# Patient Record
Sex: MALE | Race: WHITE | Employment: OTHER | URBAN - METROPOLITAN AREA
[De-identification: names, ages, dates, MRNs, and addresses within clinical notes are randomized per-mention and may not be internally consistent; named-entity substitution may affect disease eponyms.]

---

## 2017-01-03 ENCOUNTER — TELEPHONE (OUTPATIENT)
Dept: PALLIATIVE CARE | Facility: HOSPITAL | Age: 67
End: 2017-01-03

## 2017-01-03 ENCOUNTER — TELEPHONE (OUTPATIENT)
Dept: HEMATOLOGY/ONCOLOGY | Facility: HOSPITAL | Age: 67
End: 2017-01-03

## 2017-01-03 NOTE — TELEPHONE ENCOUNTER
Patient's brother Zelda Hunter calling. Zelda Olmedojose luis tells me that Bay Hoang is \"not doing very well at all. \" While his constipation has improved, Bay Hoang is not eating and has lost strength.  He is requiring more assistance from his brother, such as needing assistance wit

## 2017-01-03 NOTE — TELEPHONE ENCOUNTER
Spoke with Janay Soriano, he said his brother is getting weaker, I offered an appt with Dr Latasha Snider for tomorrow at [phone], Janay Soriano and pt agreeable. Luther Blow know that Dr Latasha Snider ordered a CBC and CMP, reviewed procedure for outpatient lab.   Janay Soriano verbalized understandi

## 2017-01-04 ENCOUNTER — APPOINTMENT (OUTPATIENT)
Dept: ULTRASOUND IMAGING | Facility: HOSPITAL | Age: 67
DRG: 436 | End: 2017-01-04
Attending: FAMILY MEDICINE
Payer: COMMERCIAL

## 2017-01-04 ENCOUNTER — HOSPITAL ENCOUNTER (INPATIENT)
Facility: HOSPITAL | Age: 67
LOS: 5 days | Discharge: HOSPICE | DRG: 436 | End: 2017-01-09
Attending: FAMILY MEDICINE | Admitting: FAMILY MEDICINE
Payer: COMMERCIAL

## 2017-01-04 ENCOUNTER — APPOINTMENT (OUTPATIENT)
Dept: CT IMAGING | Facility: HOSPITAL | Age: 67
DRG: 436 | End: 2017-01-04
Attending: INTERNAL MEDICINE
Payer: COMMERCIAL

## 2017-01-04 ENCOUNTER — OFFICE VISIT (OUTPATIENT)
Dept: HEMATOLOGY/ONCOLOGY | Facility: HOSPITAL | Age: 67
End: 2017-01-04
Attending: INTERNAL MEDICINE
Payer: COMMERCIAL

## 2017-01-04 ENCOUNTER — LAB ENCOUNTER (OUTPATIENT)
Dept: LAB | Facility: HOSPITAL | Age: 67
End: 2017-01-04
Attending: INTERNAL MEDICINE
Payer: COMMERCIAL

## 2017-01-04 VITALS
DIASTOLIC BLOOD PRESSURE: 73 MMHG | BODY MASS INDEX: 26 KG/M2 | SYSTOLIC BLOOD PRESSURE: 103 MMHG | TEMPERATURE: 97 F | HEART RATE: 137 BPM | WEIGHT: 192 LBS | RESPIRATION RATE: 22 BRPM

## 2017-01-04 DIAGNOSIS — R63.0 ANOREXIA: Primary | ICD-10-CM

## 2017-01-04 DIAGNOSIS — C34.90 LUNG CANCER (HCC): ICD-10-CM

## 2017-01-04 DIAGNOSIS — C34.92 METASTATIC LUNG CANCER (METASTASIS FROM LUNG TO OTHER SITE), LEFT (HCC): Primary | ICD-10-CM

## 2017-01-04 LAB
ALBUMIN SERPL BCP-MCNC: 2.4 G/DL (ref 3.5–4.8)
ALBUMIN/GLOB SERPL: 0.4 {RATIO} (ref 1–2)
ALP SERPL-CCNC: 407 U/L (ref 32–100)
ALT SERPL-CCNC: 50 U/L (ref 17–63)
ANION GAP SERPL CALC-SCNC: 15 MMOL/L (ref 0–18)
AST SERPL-CCNC: 211 U/L (ref 15–41)
BASOPHILS # BLD: 0 K/UL (ref 0–0.2)
BASOPHILS NFR BLD: 0 %
BILIRUB SERPL-MCNC: 7.8 MG/DL (ref 0.3–1.2)
BUN SERPL-MCNC: 7 MG/DL (ref 8–20)
BUN/CREAT SERPL: 9.7 (ref 10–20)
CALCIUM SERPL-MCNC: 10.3 MG/DL (ref 8.5–10.5)
CHLORIDE SERPL-SCNC: 92 MMOL/L (ref 95–110)
CO2 SERPL-SCNC: 23 MMOL/L (ref 22–32)
CREAT SERPL-MCNC: 0.72 MG/DL (ref 0.5–1.5)
EOSINOPHIL # BLD: 0.1 K/UL (ref 0–0.7)
EOSINOPHIL NFR BLD: 1 %
ERYTHROCYTE [DISTWIDTH] IN BLOOD BY AUTOMATED COUNT: 20.4 % (ref 11–15)
GLOBULIN PLAS-MCNC: 6.7 G/DL (ref 2.5–3.7)
GLUCOSE SERPL-MCNC: 119 MG/DL (ref 70–99)
HCT VFR BLD AUTO: 40.2 % (ref 41–52)
HGB BLD-MCNC: 13.7 G/DL (ref 13.5–17.5)
LYMPHOCYTES # BLD: 1.1 K/UL (ref 1–4)
LYMPHOCYTES NFR BLD: 10 %
MCH RBC QN AUTO: 36.9 PG (ref 27–32)
MCHC RBC AUTO-ENTMCNC: 34.1 G/DL (ref 32–37)
MCV RBC AUTO: 108.2 FL (ref 80–100)
MONOCYTES # BLD: 0.8 K/UL (ref 0–1)
MONOCYTES NFR BLD: 7 %
NEUTROPHILS # BLD AUTO: 8.9 K/UL (ref 1.8–7.7)
NEUTROPHILS NFR BLD: 70 %
NEUTS BAND NFR BLD: 12 %
OSMOLALITY UR CALC.SUM OF ELEC: 269 MOSM/KG (ref 275–295)
PLATELET # BLD AUTO: 34 K/UL (ref 140–400)
PMV BLD AUTO: 10.5 FL (ref 7.4–10.3)
POTASSIUM SERPL-SCNC: 4.6 MMOL/L (ref 3.3–5.1)
PROT SERPL-MCNC: 9.1 G/DL (ref 5.9–8.4)
RBC # BLD AUTO: 3.72 M/UL (ref 4.5–5.9)
SODIUM SERPL-SCNC: 130 MMOL/L (ref 136–144)
WBC # BLD AUTO: 10.8 K/UL (ref 4–11)

## 2017-01-04 PROCEDURE — 36415 COLL VENOUS BLD VENIPUNCTURE: CPT

## 2017-01-04 PROCEDURE — 80053 COMPREHEN METABOLIC PANEL: CPT

## 2017-01-04 PROCEDURE — 85025 COMPLETE CBC W/AUTO DIFF WBC: CPT

## 2017-01-04 PROCEDURE — 74177 CT ABD & PELVIS W/CONTRAST: CPT

## 2017-01-04 PROCEDURE — 76705 ECHO EXAM OF ABDOMEN: CPT

## 2017-01-04 PROCEDURE — 99223 1ST HOSP IP/OBS HIGH 75: CPT | Performed by: INTERNAL MEDICINE

## 2017-01-04 PROCEDURE — 71260 CT THORAX DX C+: CPT

## 2017-01-04 RX ORDER — SENNA AND DOCUSATE SODIUM 50; 8.6 MG/1; MG/1
1 TABLET, FILM COATED ORAL 2 TIMES DAILY
Status: DISCONTINUED | OUTPATIENT
Start: 2017-01-04 | End: 2017-01-09

## 2017-01-04 RX ORDER — HYDROCODONE BITARTRATE AND ACETAMINOPHEN 10; 325 MG/1; MG/1
1 TABLET ORAL EVERY 4 HOURS PRN
Status: DISCONTINUED | OUTPATIENT
Start: 2017-01-04 | End: 2017-01-06

## 2017-01-04 RX ORDER — DEXTROSE AND SODIUM CHLORIDE 5; .45 G/100ML; G/100ML
INJECTION, SOLUTION INTRAVENOUS CONTINUOUS
Status: DISCONTINUED | OUTPATIENT
Start: 2017-01-04 | End: 2017-01-04

## 2017-01-04 RX ORDER — ONDANSETRON 2 MG/ML
4 INJECTION INTRAMUSCULAR; INTRAVENOUS EVERY 6 HOURS PRN
Status: DISCONTINUED | OUTPATIENT
Start: 2017-01-04 | End: 2017-01-09

## 2017-01-04 RX ORDER — SODIUM CHLORIDE 9 MG/ML
INJECTION, SOLUTION INTRAVENOUS CONTINUOUS
Status: DISCONTINUED | OUTPATIENT
Start: 2017-01-04 | End: 2017-01-07

## 2017-01-04 RX ORDER — MORPHINE SULFATE 2 MG/ML
5 INJECTION, SOLUTION INTRAMUSCULAR; INTRAVENOUS EVERY 4 HOURS PRN
Status: DISCONTINUED | OUTPATIENT
Start: 2017-01-04 | End: 2017-01-09

## 2017-01-04 RX ORDER — IBUPROFEN 400 MG/1
200 TABLET ORAL EVERY 6 HOURS PRN
Status: DISCONTINUED | OUTPATIENT
Start: 2017-01-04 | End: 2017-01-09

## 2017-01-04 RX ORDER — METOPROLOL TARTRATE 100 MG/1
100 TABLET ORAL 2 TIMES DAILY
Status: DISCONTINUED | OUTPATIENT
Start: 2017-01-04 | End: 2017-01-09

## 2017-01-04 RX ORDER — SODIUM CHLORIDE 0.9 % (FLUSH) 0.9 %
SYRINGE (ML) INJECTION
Status: DISPENSED
Start: 2017-01-04 | End: 2017-01-05

## 2017-01-04 RX ORDER — POLYETHYLENE GLYCOL 3350 17 G/17G
17 POWDER, FOR SOLUTION ORAL DAILY
Status: DISCONTINUED | OUTPATIENT
Start: 2017-01-04 | End: 2017-01-09

## 2017-01-05 LAB
ALBUMIN SERPL BCP-MCNC: 2 G/DL (ref 3.5–4.8)
ALBUMIN/GLOB SERPL: 0.4 {RATIO} (ref 1–2)
ALP SERPL-CCNC: 313 U/L (ref 32–100)
ALT SERPL-CCNC: 39 U/L (ref 17–63)
ANION GAP SERPL CALC-SCNC: 9 MMOL/L (ref 0–18)
AST SERPL-CCNC: 160 U/L (ref 15–41)
BASOPHILS # BLD: 0 K/UL (ref 0–0.2)
BASOPHILS NFR BLD: 1 %
BILIRUB SERPL-MCNC: 6.8 MG/DL (ref 0.3–1.2)
BUN SERPL-MCNC: 6 MG/DL (ref 8–20)
BUN/CREAT SERPL: 14 (ref 10–20)
CALCIUM SERPL-MCNC: 9.2 MG/DL (ref 8.5–10.5)
CHLORIDE SERPL-SCNC: 95 MMOL/L (ref 95–110)
CO2 SERPL-SCNC: 23 MMOL/L (ref 22–32)
CREAT SERPL-MCNC: 0.43 MG/DL (ref 0.5–1.5)
EOSINOPHIL # BLD: 0.6 K/UL (ref 0–0.7)
EOSINOPHIL NFR BLD: 6 %
ERYTHROCYTE [DISTWIDTH] IN BLOOD BY AUTOMATED COUNT: 20.1 % (ref 11–15)
FOLATE SERPL-MCNC: 3.3 NG/ML
GLOBULIN PLAS-MCNC: 5.5 G/DL (ref 2.5–3.7)
GLUCOSE SERPL-MCNC: 95 MG/DL (ref 70–99)
HCT VFR BLD AUTO: 33.7 % (ref 41–52)
HGB BLD-MCNC: 11.7 G/DL (ref 13.5–17.5)
LYMPHOCYTES # BLD: 1.2 K/UL (ref 1–4)
LYMPHOCYTES NFR BLD: 12 %
MCH RBC QN AUTO: 37.5 PG (ref 27–32)
MCHC RBC AUTO-ENTMCNC: 34.7 G/DL (ref 32–37)
MCV RBC AUTO: 108 FL (ref 80–100)
MONOCYTES # BLD: 1.1 K/UL (ref 0–1)
MONOCYTES NFR BLD: 11 %
NEUTROPHILS # BLD AUTO: 7.4 K/UL (ref 1.8–7.7)
NEUTROPHILS NFR BLD: 71 %
OSMOLALITY UR CALC.SUM OF ELEC: 261 MOSM/KG (ref 275–295)
PLATELET # BLD AUTO: 25 K/UL (ref 140–400)
PMV BLD AUTO: 10.4 FL (ref 7.4–10.3)
POTASSIUM SERPL-SCNC: 4.3 MMOL/L (ref 3.3–5.1)
PROT SERPL-MCNC: 7.5 G/DL (ref 5.9–8.4)
RBC # BLD AUTO: 3.12 M/UL (ref 4.5–5.9)
SODIUM SERPL-SCNC: 127 MMOL/L (ref 136–144)
VIT B12 SERPL-MCNC: 1402 PG/ML (ref 181–914)
WBC # BLD AUTO: 10.4 K/UL (ref 4–11)

## 2017-01-05 PROCEDURE — 99233 SBSQ HOSP IP/OBS HIGH 50: CPT | Performed by: INTERNAL MEDICINE

## 2017-01-05 NOTE — CONSULTS
[facility]    PATIENT'S NAME: Demacro Ramossaroj   ATTENDING PHYSICIAN: Melinda Reaves. Lenore Dias MD   CONSULTING PHYSICIAN: Sisi Rm.  Deya Rojas MD   PATIENT ACCOUNT#:   [id number]    LOCATION:  [facility] [address] RECORD #:   [id number]       DATE OF BIRTH:  0 allergies. SOCIAL HISTORY:  The patient is a former smoker to the time of his cancer of more than 30-pack years. He denies any alcohol or illicit drug use. FAMILY HISTORY:  History of lung cancer in his father in his 62s.     REVIEW OF SYSTEMS:  All complete CT of the chest, abdomen, and pelvis. 2.   With regard to the patient's metastatic squamous cell carcinoma, he initially responded to carboplatin and paclitaxel.   His performance status is currently poor; however, he still would be a candidate

## 2017-01-05 NOTE — PROGRESS NOTES
[facility]    Hematology/Oncology   Progress Note    Mary Vaughan Patient Status:  Inpatient    1950 MRN [id number]   Location [facility] 4W/SW/SE Attending Joan Hooker MD   Hosp Day # 1 PCP Amina Jones MD, MD Moisés Montero differential for this finding. Correlate with AFP levels. Stable left hilar lymphadenopathy. Stable soft tissue spiculated nodule in the left upper lobe. Findings may represent chronic treated disease. 2.  Small quantity of abdominal-pelvic ascites.   Jose David Massey

## 2017-01-05 NOTE — PLAN OF CARE
PAIN - ADULT    • Verbalizes/displays adequate comfort level or patient's stated pain goal Progressing        Patient Centered Care    • Patient preferences are identified and integrated in the patient's plan of care [id number]

## 2017-01-06 ENCOUNTER — APPOINTMENT (OUTPATIENT)
Dept: HEMATOLOGY/ONCOLOGY | Facility: HOSPITAL | Age: 67
End: 2017-01-06
Attending: INTERNAL MEDICINE
Payer: COMMERCIAL

## 2017-01-06 PROBLEM — R52 PAIN: Status: ACTIVE | Noted: 2017-01-06

## 2017-01-06 PROCEDURE — 99254 IP/OBS CNSLTJ NEW/EST MOD 60: CPT | Performed by: INTERNAL MEDICINE

## 2017-01-06 PROCEDURE — 99232 SBSQ HOSP IP/OBS MODERATE 35: CPT | Performed by: INTERNAL MEDICINE

## 2017-01-06 RX ORDER — HYDROCODONE BITARTRATE AND ACETAMINOPHEN 10; 325 MG/1; MG/1
1 TABLET ORAL EVERY 6 HOURS
Status: DISCONTINUED | OUTPATIENT
Start: 2017-01-06 | End: 2017-01-06

## 2017-01-06 RX ORDER — CALCIUM CARBONATE 200(500)MG
500 TABLET,CHEWABLE ORAL 3 TIMES DAILY PRN
Status: DISCONTINUED | OUTPATIENT
Start: 2017-01-06 | End: 2017-01-09

## 2017-01-06 RX ORDER — OXYCODONE HYDROCHLORIDE 5 MG/1
10 TABLET ORAL EVERY 6 HOURS
Status: DISCONTINUED | OUTPATIENT
Start: 2017-01-06 | End: 2017-01-09

## 2017-01-06 NOTE — DISCHARGE PLANNING
1/6CM-MD orders received in regards to discharge planning-Patient and brother want information about SNF for hospice. Please provide information as soon as possible. Brother Sher Olmos can be reached at [phone] and [email or web address].  Thank

## 2017-01-06 NOTE — PLAN OF CARE
Verbalizes/displays adequate comfort level or patient's stated pain goal Progressing      Absence of fever/infection during anticipated neutropenic period Progressing      Free from fall injury Progressing        Pt stable throughout shift.  Eval for PT, OT

## 2017-01-06 NOTE — PROGRESS NOTES
[facility]    Progress Note    Alfonso Leiva Patient Status:  Inpatient    1950 MRN [id number]   Location [facility] 4W/SW/SE Attending Moose Broussard MD   Hosp Day # 2 PCP Chad Gonzalez MD, Nafisa Olmos MD       Subjective:   Hortencia Gaucher stable since prior exam from 12/1/16. One of the largest lesions is in the left acetabulum/iliac wing, which is grossly unchanged since prior exam.  The liver has shown interval enlargement and has a scalloped morphology suggesting cirrhosis.   Additionall

## 2017-01-06 NOTE — PLAN OF CARE
PAIN - ADULT    • Verbalizes/displays adequate comfort level or patient's stated pain goal Progressing        Patient Centered Care    • Patient preferences are identified and integrated in the patient's plan of care Progressing        RISK FOR INFECTION -

## 2017-01-06 NOTE — DIETARY NOTE
Pt screened at nutritional risk at admission due to wt loss and skin breakdown. Chart review completed and noted end of life care and plans for hospice at SNF. Nutritional assessment not warranted at this time.   Monitor for change in medical status/plans

## 2017-01-06 NOTE — PLAN OF CARE
Verbalizes/displays adequate comfort level or patient's stated pain goal Progressing      Patient preferences are identified and integrated in the patient's plan of care Progressing      Patient/Family Short Term Goal Progressing        Lupita Halsted had a palliativ

## 2017-01-06 NOTE — CONSULTS
[address] [place] [address] Dr BECK Garcia Patient Status:  Inpatient    1950 MRN [id number]   Location [facility] 4W/SW/SE Attending Jesica Walters MD   Hosp Day # 2 PCP Martha Najera MD, Felice Lemus MD     Date of C observed. He appears very depressed with a flat affect. He often puts his hands over his eyes as I am talking with him and eye contact is poor at times.  He denies pain at this time but tells me that he feels generalized and back pain from physical inactivi spends \"99%\" of his time in bed or sitting. He uses a W/C for ambulation. I discussed reason for palliative care consultation. We talked about Mirst Ivory' current clinical condition. We talked about his functional decline especially over the past 2 weeks. DNI, and no feeding tube placement. These documents can be found in EPIC, but I also placed paper copies into the chart.     Medical History:  Past Medical History   Diagnosis Date   • Essential hypertension    • Hyperlipidemia    • Cancer ([facility] [address]          Pa CREATSERUM 0.43* 01/05/2017   BUN 6* 01/05/2017   * 01/05/2017   K 4.3 01/05/2017   CL 95 01/05/2017   CO2 23 01/05/2017   GLU 95 01/05/2017   CA 9.2 01/05/2017   ALB 2.0* 01/05/2017   ALKPHO 313* 01/05/2017   BILT 6.8* 01/05/2017   TP 7.5 01/05/20 Objective:  Vital Signs:  Blood pressure 105/60, pulse 101, temperature 97.7 °F (36.5 °C), temperature source Oral, resp. rate 22, height 6' 5\" (1.956 m), weight 191 lb 4.8 oz (86.773 kg), SpO2 92 %. Body mass index is 22.68 kg/(m^2).   Present Kiran g-tubeplacement. Continue supportive medical treatments until discharge. Patient and family are interested in information for SNF with hospice care.     Assessment/Recommendations:    Metastatic lung cancer (metastasis from lung to other site) [facility])  -Be does not think will be feasible. Meghann Hinton asked about hospice services at a SNF. Meghann Hinton tells me that there are finances to pay for the room and board at a SNF. Meghann Hinton also asked me to cancel the PT and OT evaluations.  I asked Adrienne Mazariegos to provide infor his medical chart and I also confirmed with Clement Miranda his goals and his wishes. He understands that his diagnosis is terminal. His brother is in his room and he, too, is aware of Clement Miranda' clinical condition.   The goal is to transfer to a SNF under hospice car at 4 PM today then giving it about 1-1 1/2 hours to assist him to a chair  His brother will order dinner tonight around that time  Tums TID prn  Eventual transfer to SNF under hospice care    I spoke with Dr Mora Christie concerning the above plan of care    Thank

## 2017-01-06 NOTE — PAYOR COMM NOTE
REF: [id number]  Chief Complaint:    Jaundice and weakness      HPI:    Luigi Cruz is a(n) 61year old male with PMH significant for metastatic SCC lung ca to the liver and bones dxed per biopsy of chest wall mass and liver in 6/16.  Pt has been unde Results  Component  Value  Date    [facility]  [facility]    HGB  11.7*  01/05/2017    HCT  33.7*  01/05/2017    PLT  25*  01/05/2017    CREATSERUM  0.43*  01/05/2017    BUN  6*  01/05/2017    NA  127*  01/05/2017    K  4.3  01/05/2017    CL  95  01/05/2017 Coronary artery calcifications. 6.  Bilateral renal scarring. 7.  Colonic diverticulosis without inflammation.  8.  Chronic pathologic fracture at L3 without significant retropulsion.  Chronic posterior right 11th rib pathologic fracture.              Asses weight.  He is, otherwise, without complaints. PAST MEDICAL HISTORY:  Metastatic squamous cell carcinoma of the lung as noted above, hypertension, hyperlipidemia.     MEDICATIONS:  At home:  MiraLax, sennoside docusate,     ALLERGIES:  No known drug yeimi hyperbilirubinemia/jaundice with worsening overall weakness/fatigue and weight loss.      1.      Regarding the patient's new jaundice/hyperbilirubinemia, he is getting an ultrasound of his abdomen.  We will also go ahead and restage his lung cancer with a

## 2017-01-07 RX ORDER — FUROSEMIDE 10 MG/ML
20 INJECTION INTRAMUSCULAR; INTRAVENOUS ONCE
Status: COMPLETED | OUTPATIENT
Start: 2017-01-07 | End: 2017-01-07

## 2017-01-07 RX ORDER — SCOLOPAMINE TRANSDERMAL SYSTEM 1 MG/1
1 PATCH, EXTENDED RELEASE TRANSDERMAL
Status: DISCONTINUED | OUTPATIENT
Start: 2017-01-07 | End: 2017-01-09

## 2017-01-07 RX ORDER — SODIUM CHLORIDE 0.9 % (FLUSH) 0.9 %
SYRINGE (ML) INJECTION
Status: COMPLETED
Start: 2017-01-07 | End: 2017-01-07

## 2017-01-07 NOTE — PROGRESS NOTES
[facility]    Progress Note    Shemar Cutler Patient Status:  Inpatient    1950 MRN [id number]   Location [facility] 4W/SW/SE Attending Khadra Jacobsen MD   Hosp Day # 3 PCP Comfort Danielle MD, Stormy Warren MD       Subjective:   Romy Armas

## 2017-01-08 RX ORDER — BENZONATATE 100 MG/1
100 CAPSULE ORAL EVERY 4 HOURS PRN
Status: DISCONTINUED | OUTPATIENT
Start: 2017-01-08 | End: 2017-01-09

## 2017-01-08 RX ORDER — IPRATROPIUM BROMIDE AND ALBUTEROL SULFATE 2.5; .5 MG/3ML; MG/3ML
SOLUTION RESPIRATORY (INHALATION)
Status: COMPLETED
Start: 2017-01-08 | End: 2017-01-08

## 2017-01-08 RX ORDER — IPRATROPIUM BROMIDE AND ALBUTEROL SULFATE 2.5; .5 MG/3ML; MG/3ML
3 SOLUTION RESPIRATORY (INHALATION) EVERY 6 HOURS PRN
Status: DISCONTINUED | OUTPATIENT
Start: 2017-01-08 | End: 2017-01-09

## 2017-01-08 NOTE — PROGRESS NOTES
[facility]    Progress Note    Curt De Luna Patient Status:  Inpatient    1950 MRN [id number]   Location [facility] 4W/SW/SE Attending Blaire Wolf MD   Hosp Day # 4 PCP Media Seip MD, Christine Cain MD       Subjective:   Anastasiia Manual

## 2017-01-08 NOTE — PLAN OF CARE
PAIN - ADULT    • Verbalizes/displays adequate comfort level or patient's stated pain goal Progressing        Patient Centered Care    • Patient preferences are identified and integrated in the patient's plan of care Progressing        Patient/Family Goals

## 2017-01-09 VITALS
TEMPERATURE: 98 F | HEIGHT: 77 IN | DIASTOLIC BLOOD PRESSURE: 68 MMHG | BODY MASS INDEX: 22.59 KG/M2 | HEART RATE: 80 BPM | RESPIRATION RATE: 18 BRPM | OXYGEN SATURATION: 90 % | WEIGHT: 191.31 LBS | SYSTOLIC BLOOD PRESSURE: 106 MMHG

## 2017-01-09 RX ORDER — IPRATROPIUM BROMIDE AND ALBUTEROL SULFATE 2.5; .5 MG/3ML; MG/3ML
3 SOLUTION RESPIRATORY (INHALATION) EVERY 6 HOURS PRN
Qty: 360 ML | Refills: 0 | Status: SHIPPED | OUTPATIENT
Start: 2017-01-09 | End: 2017-02-08

## 2017-01-09 RX ORDER — OXYCODONE HYDROCHLORIDE 10 MG/1
10 TABLET ORAL EVERY 6 HOURS
Qty: 40 TABLET | Refills: 0 | Status: SHIPPED | OUTPATIENT
Start: 2017-01-09 | End: 2017-01-19

## 2017-01-09 RX ORDER — METOPROLOL TARTRATE 100 MG/1
100 TABLET ORAL 2 TIMES DAILY
Qty: 60 TABLET | Refills: 0 | Status: SHIPPED | OUTPATIENT
Start: 2017-01-09 | End: 2017-02-08

## 2017-01-09 RX ORDER — BENZONATATE 100 MG/1
100 CAPSULE ORAL EVERY 4 HOURS PRN
Qty: 30 CAPSULE | Refills: 0 | Status: SHIPPED | OUTPATIENT
Start: 2017-01-09

## 2017-01-09 RX ORDER — OXYCODONE HYDROCHLORIDE 10 MG/1
10 TABLET ORAL EVERY 6 HOURS
Qty: 50 TABLET | Refills: 0 | Status: SHIPPED | OUTPATIENT
Start: 2017-01-09 | End: 2017-01-09

## 2017-01-09 RX ORDER — CALCIUM CARBONATE 200(500)MG
500 TABLET,CHEWABLE ORAL 3 TIMES DAILY PRN
Qty: 50 TABLET | Refills: 0 | Status: SHIPPED | OUTPATIENT
Start: 2017-01-09 | End: 2017-02-28

## 2017-01-09 RX ORDER — SCOLOPAMINE TRANSDERMAL SYSTEM 1 MG/1
1 PATCH, EXTENDED RELEASE TRANSDERMAL
Qty: 10 PATCH | Refills: 0 | Status: SHIPPED | OUTPATIENT
Start: 2017-01-09 | End: 2017-02-08

## 2017-01-09 NOTE — DISCHARGE SUMMARY
[facility]    Discharge Summary    Luda Leary Patient Status:  Inpatient    1950 MRN [id number]   Location [facility] 4W/SW/SE Attending Betty Stewart MD   Hosp Day # 5 PCP Mitch Sanchez MD, Wil Hagen MD     Date of Admission:  Transdermal Patch 72 Hr  Place 1 patch onto the skin every third day. benzonatate 100 MG Oral Cap  Take 1 capsule (100 mg total) by mouth every 4 (four) hours as needed for cough.       Home Meds - Modified    Metoprolol Tartrate 100 MG Oral Tab  Take 1

## 2017-01-09 NOTE — DISCHARGE PLANNING
1/9CM-The Patient's brother is requesting a referral to [facility]. Case Management has made a referral to [facility] and sent out referral documents. 1/9CM11:21-Per Rounds the Patient is medically stable for discharge today (1/9).  This W

## 2017-01-09 NOTE — CONSULTS
Patient seen and examined with no family at the bedside. Suki Otero is only oriented to himself at this time. When asked about pain, he did not answer my question. He tells me that he still is unable to sleep.  I reminded Suki Otero that the plan is for him to go t

## 2017-01-09 NOTE — PROGRESS NOTES
[place] [facility]    Progress Note    Teri Kaye Patient Status:  Inpatient    1950 MRN [id number]   Location [facility] 4W/SW/SE Attending Yvonne Lazo MD   Hosp Day # 5 PCP Raffi Garcias MD, Claudia Perez MD       Subjective:   Pearl Soto

## 2021-02-02 DIAGNOSIS — Z23 NEED FOR VACCINATION: ICD-10-CM

## (undated) NOTE — IP AVS SNAPSHOT
Patient Demographics     Address Phone E-mail Address    [address] [id number] (Home) *Preferred* [email or web address]      Emergency Contact(s)     Name Relation Home Work [employer] Brother   [phone] Kaylynn PARKS                                 Scopolamine 1 MG/3DAYS Pt72   Last time this was given:  1 patch on 1/7/2017  8:30 AM   Commonly known as:  TRANSDERM-SCOP        Place 1 patch onto the skin every third day.    Stop taking on:  2/8/2017    Berna Rivera Resp 18 Filed at 01/09/2017 0435    Temp 97.8 °F (36.6 °C) Filed at 01/09/2017 0435    SpO2 90 % Filed at 01/09/2017 0435      Patient's Most Recent Weight       Most Recent Value    Patient Weight 86.773 kg (191 lb 4.8 oz)      Lab Results Last 24 Hours his diagnosis. He has received 6 rounds of Taxol and Carboplatin and had been on a treatment break. Over the last 4 days prior to admission, pt reports worsening fatigue and generalized weakness. Otherwise, he denies any issues with abdominal pain.  No feve Throat: dry mucous membranes  Neck: no adenopathy, no JVD, supple, symmetrical, trachea midline and thyroid not enlarged, symmetric, no tenderness/mass/nodules  Pulmonary:  clear to auscultation bilaterally  Chest wall: no tenderness, no masses noted  Card represent chronic treated disease. 2.  Small quantity of abdominal-pelvic ascites. The quantity of fluid has significantly increased since prior exam. 3.  Multiple hypoenhancing foci in both kidneys, suspicious for metastatic disease.   These findings are Citlaly Garcia Patient Status:  Inpatient    1950 MRN [id number]   Location [facility] 4W/SW/SE Attending Julia Castro MD   Hosp Day # 2 PCP Katiuska Rodriguez MD, Edward House MD     Date of Consult: 17  Patient seen at: [facility] He often puts his hands over his eyes as I am talking with him and eye contact is poor at times. He denies pain at this time but tells me that he feels generalized and back pain from physical inactivity.  Beth Butler says that the [address] PRN has been effec sitting. He uses a W/C for ambulation. I discussed reason for palliative care consultation. We talked about Van Child' current clinical condition. We talked about his functional decline especially over the past 2 weeks.  We talked about his poor appetite, w wish for DNR, DNI, and no feeding tube placement. These documents can be found in EPIC, but I also placed paper copies into the chart.     Medical History:  Past Medical History   Diagnosis Date   • Essential hypertension    • Hyperlipidemia    • Cancer (HC Component Value Date   CREATSERUM 0.43* 01/05/2017   BUN 6* 01/05/2017   * 01/05/2017   K 4.3 01/05/2017   CL 95 01/05/2017   CO2 23 01/05/2017   GLU 95 01/05/2017   CA 9.2 01/05/2017   ALB 2.0* 01/05/2017   ALKPHO 313* 01/05/2017   BILT 6.8* 01/05/2 posterior right 11th rib pathologic fracture. Objective:  Vital Signs:  Blood pressure 105/60, pulse 101, temperature 97.7 °F (36.5 °C), temperature source Oral, resp. rate 22, height 6' 5\" (1.956 m), weight 191 lb 4.8 oz (86.773 kg), SpO2 92 %.   B Do not initiate invasive artificial nutrition through any type of enteral tube. Procedures:  No intubation. No g-tubeplacement. Continue supportive medical treatments until discharge.  Patient and family are interested in information for SNF with hos increasing care that [facility] requires. We discussed the logistics of having a 24/7 caregiver in [facility]' home, which Luis Montano does not think will be feasible. Luis Montano asked about hospice services at a SNF.  Luis Montano tells me that there are finances to pay for [facility] MUNIRA Schrader, AGPCNP-BC, [id number]  1/6/2017  10:37 AM        ADDENDUM: I evaluated Serena Euceda with Jose BRISENO at 3:20 P. M. Today. I reviewed his medical chart and I also confirmed with Serena Euceda his goals and his wishes.   He understands that his diagnosi Goal is for pain control with the hopes to increase some functionality, improve mood  Stop Norco  Start Oxycodone at 10 mg po Q6 ATC with the first dose being at 4 PM today then giving it about 1-1 1/2 hours to assist him to a chair  His brother will order Neck:supple,no JVD  Pulm: Lungs clear, normal respiratory effort  CV: Heart with regular rate and rhythm, Nl S1,S2 ,no S3 or murmur  Abd: Abdomen soft, liver palpable  Ext:  no clubbing, no cyanosis,no edema  Neuro: no focal deficits  Skin: jaundice

## (undated) NOTE — IP AVS SNAPSHOT
[address]  [facility] ~ [phone]                Discharge Summary   1/4/2017    Buzz Garcia           Admission Information        Provider Department    1/4/2017 Martha Najera MD, Felice Lemus MD [facility] 4w/ OxyCODONE HCl IR 10 MG Tabs   Last time this was given:  10 mg on 1/9/2017 11:39 AM   Commonly known as:  ROXICODONE        Take 1 tablet (10 mg total) by mouth every 6 (six) hours.     Yumi Hardin 13.4 (L) (12/19/16)  38.9 (L) (12/19/16)  102.7 (H) (12/19/16)  35.3 (H) (12/19/16)  34.4  (12/19/16)  47 (L) (12/19/16)  10.1      Recent Hematology Lab Results (cont.)  (Last 3 results in the past 90 days)    Neutrophil % Lymphocyte % Monocyte % Eosinoph harming yourself, contact [facility] at [phone]. - If you don’t have insurance, [facility] has partnered with Patient [address] to help you get signed up for insurance coverage.   Patient [place] and/or abnormal heart rates/rhythms   Most common side effects: Dizziness or feeling lightheaded (especially with standing), heart rate changes, headaches, nausea/vomiting   What to report to your healthcare team:  Dizziness, nausea, chest pain, weakness,